# Patient Record
Sex: FEMALE | Race: WHITE | NOT HISPANIC OR LATINO | URBAN - METROPOLITAN AREA
[De-identification: names, ages, dates, MRNs, and addresses within clinical notes are randomized per-mention and may not be internally consistent; named-entity substitution may affect disease eponyms.]

---

## 2022-04-26 ENCOUNTER — APPOINTMENT (OUTPATIENT)
Dept: WOMENS IMAGING | Facility: HOSPITAL | Age: 62
End: 2022-04-26

## 2022-04-26 PROCEDURE — 77063 BREAST TOMOSYNTHESIS BI: CPT | Performed by: RADIOLOGY

## 2022-04-26 PROCEDURE — 77067 SCR MAMMO BI INCL CAD: CPT | Performed by: RADIOLOGY

## 2025-05-29 ENCOUNTER — APPOINTMENT (OUTPATIENT)
Dept: WOMENS IMAGING | Facility: HOSPITAL | Age: 65
End: 2025-05-29
Payer: COMMERCIAL

## 2025-05-29 PROCEDURE — G0279 TOMOSYNTHESIS, MAMMO: HCPCS | Performed by: RADIOLOGY

## 2025-05-29 PROCEDURE — 77065 DX MAMMO INCL CAD UNI: CPT | Performed by: RADIOLOGY

## 2025-05-29 PROCEDURE — 76642 ULTRASOUND BREAST LIMITED: CPT | Performed by: RADIOLOGY

## 2025-05-29 PROCEDURE — 77061 BREAST TOMOSYNTHESIS UNI: CPT | Performed by: RADIOLOGY

## 2025-06-17 ENCOUNTER — LAB REQUISITION (OUTPATIENT)
Dept: LAB | Facility: HOSPITAL | Age: 65
End: 2025-06-17
Payer: COMMERCIAL

## 2025-06-17 ENCOUNTER — APPOINTMENT (OUTPATIENT)
Dept: WOMENS IMAGING | Facility: HOSPITAL | Age: 65
End: 2025-06-17
Payer: COMMERCIAL

## 2025-06-17 DIAGNOSIS — N63.0 UNSPECIFIED LUMP IN UNSPECIFIED BREAST: ICD-10-CM

## 2025-06-17 PROCEDURE — 88342 IMHCHEM/IMCYTCHM 1ST ANTB: CPT | Performed by: OBSTETRICS & GYNECOLOGY

## 2025-06-17 PROCEDURE — 19000 PUNCTURE ASPIR CYST BREAST: CPT | Performed by: RADIOLOGY

## 2025-06-17 PROCEDURE — 88305 TISSUE EXAM BY PATHOLOGIST: CPT | Performed by: OBSTETRICS & GYNECOLOGY

## 2025-06-17 PROCEDURE — 88112 CYTOPATH CELL ENHANCE TECH: CPT | Performed by: OBSTETRICS & GYNECOLOGY

## 2025-06-17 PROCEDURE — 76942 ECHO GUIDE FOR BIOPSY: CPT | Performed by: RADIOLOGY

## 2025-06-17 PROCEDURE — 88360 TUMOR IMMUNOHISTOCHEM/MANUAL: CPT | Performed by: OBSTETRICS & GYNECOLOGY

## 2025-06-20 LAB
CYTO UR: NORMAL
DX PRELIMINARY: NORMAL
LAB AP CASE REPORT: NORMAL
LAB AP SPECIAL STAINS: NORMAL
PATH REPORT.FINAL DX SPEC: NORMAL
PATH REPORT.GROSS SPEC: NORMAL

## 2025-07-07 ENCOUNTER — APPOINTMENT (OUTPATIENT)
Dept: WOMENS IMAGING | Facility: HOSPITAL | Age: 65
End: 2025-07-07
Payer: COMMERCIAL

## 2025-07-07 ENCOUNTER — LAB REQUISITION (OUTPATIENT)
Dept: LAB | Facility: HOSPITAL | Age: 65
End: 2025-07-07
Payer: COMMERCIAL

## 2025-07-07 DIAGNOSIS — N63.10 UNSPECIFIED LUMP IN THE RIGHT BREAST, UNSPECIFIED QUADRANT: ICD-10-CM

## 2025-07-07 PROCEDURE — A4648 IMPLANTABLE TISSUE MARKER: HCPCS | Performed by: RADIOLOGY

## 2025-07-07 PROCEDURE — 88341 IMHCHEM/IMCYTCHM EA ADD ANTB: CPT | Performed by: OBSTETRICS & GYNECOLOGY

## 2025-07-07 PROCEDURE — 88360 TUMOR IMMUNOHISTOCHEM/MANUAL: CPT | Performed by: OBSTETRICS & GYNECOLOGY

## 2025-07-07 PROCEDURE — 88305 TISSUE EXAM BY PATHOLOGIST: CPT | Performed by: OBSTETRICS & GYNECOLOGY

## 2025-07-07 PROCEDURE — 88342 IMHCHEM/IMCYTCHM 1ST ANTB: CPT | Performed by: OBSTETRICS & GYNECOLOGY

## 2025-07-07 PROCEDURE — C1819 TISSUE LOCALIZATION-EXCISION: HCPCS | Performed by: RADIOLOGY

## 2025-07-07 PROCEDURE — 19081 BX BREAST 1ST LESION STRTCTC: CPT | Performed by: RADIOLOGY

## 2025-07-10 LAB
CYTO UR: NORMAL
DX PRELIMINARY: NORMAL
LAB AP CASE REPORT: NORMAL
LAB AP CLINICAL INFORMATION: NORMAL
LAB AP SPECIAL STAINS: NORMAL
LAB AP SYNOPTIC CHECKLIST: NORMAL
PATH REPORT.FINAL DX SPEC: NORMAL
PATH REPORT.GROSS SPEC: NORMAL

## 2025-07-14 NOTE — PROGRESS NOTES
[facility]     Referring Provider: Dr. Ritesh Joshua     Chief complaint: newly diagnosed breast cancer    Subjective    HPI: Ms. Carter is a 65 y.o. yo woman, seen at the request of Ritesh Joshua for a new diagnosis of right breast cancer.      This was initially detected as an imaging abnormality. Her work-up is detailed in the oncologic history below.   She denies any breast lumps, bumps, pain, skin changes, or nipple discharge.      She denies any prior history of abnormal mammograms or breast biopsies.     She reports she has a pertinent PMH of depression, hypothyroidism, hyperlipidemia, obesity and fatty liver.     She was joined today in clinic by her . She does consent for them to be present during her examination. They did participate in the discussion.      Oncology/Hematology History   Malignant neoplasm of upper-inner quadrant of right breast in female, estrogen receptor positive   7/7/2025 Cancer Staged    Staging form: Breast, AJCC 8th Edition  - Clinical stage from 7/7/2025: Stage IA (cT1a, cN0, cM0, G1, ER+, ND+, HER2-) - Signed by Cora Rai MD on 7/15/2025     7/15/2025 Initial Diagnosis    Malignant neoplasm of upper-inner quadrant of right breast in female, estrogen receptor positive         Review of Systems   Constitutional:  Negative for appetite change, fatigue and fever.   HENT:   Positive for hearing loss and tinnitus.    Respiratory:  Negative for cough and shortness of breath.    Gastrointestinal:  Negative for abdominal distention, diarrhea and nausea.   Endocrine: Positive for hot flashes.   Genitourinary:  Positive for frequency and nocturia.    Musculoskeletal:  Negative for arthralgias and back pain.        Arthritis   Neurological:  Negative for dizziness, headaches and speech difficulty.   Psychiatric/Behavioral:  Positive for depression. Negative for decreased concentration and sleep disturbance.        Medications:    Current Outpatient Medications:      atorvastatin (LIPITOR) 20 MG tablet, Take 1 tablet by mouth Daily., Disp: , Rfl:     busPIRone (BUSPAR) 30 MG tablet, Take 1 tablet by mouth., Disp: , Rfl:     DULoxetine (CYMBALTA) 60 MG capsule, TAKE 1 CAPSULE BY MOUTH DAILY DO NOT CRUSH OR CHEW, Disp: , Rfl:     levothyroxine (SYNTHROID, LEVOTHROID) 100 MCG tablet, Take 1 tablet by mouth Daily., Disp: , Rfl:     valsartan (DIOVAN) 80 MG tablet, , Disp: , Rfl:     Allergies:  Allergies   Allergen Reactions    Penicillins Hives and Rash       Medical history:  Past Medical History:   Diagnosis Date    Arthritis        Surgical History:  Past Surgical History:   Procedure Laterality Date    GALLBLADDER SURGERY      TUBAL ABDOMINAL LIGATION         Family History:  Family History   Problem Relation Age of Onset    Breast cancer Mother 78        Passed at 79    Melanoma Mother     Prostate cancer Father 78        Passed @ 82    Colon cancer Paternal Grandmother        Family Cancer History: relationship - (age of diagnosis/current age or age at death)  Breast: Mother  Ovarian: No family history of ovarian cancer.  Colon: Paternal grandmother   Pancreas: No family history of pancreatic cancer.  Prostate: Father  Lung Cancer: No family history of lung cancer.       Social History:   Social History     Socioeconomic History    Marital status: Unknown    Number of children: 2   Tobacco Use    Smoking status: Never    Smokeless tobacco: Never   Vaping Use    Vaping status: Never Used   Substance and Sexual Activity    Alcohol use: Never    Drug use: Never    Sexual activity: Yes     Partners: Male       She lives   She is Retired         GYNECOLOGIC HISTORY:   . P: 2. AB: 0.  Last menstrual period: 52  Age at menarche: 15  Age at first childbirth: 38  Lactation/How lon weeks  Age at menopause: 52  Total years of oral contraceptive use: N/A  Total years of hormone replacement therapy: n/A      Objective   PHYSICAL EXAMINATION:   Vitals:    07/15/25 1242    BP: 144/90   Pulse: 108   SpO2: 97%     ECOG 0 - Asymptomatic  General: NAD, well appearing  Psych: a&o x 3, normal mood and affect  Eyes: EOMI, no scleral icterus  ENMT: neck supple without masses or thyromegaly, mucus membranes moist  Resp: normal effort  CV: RRR, no edema   GI: soft, NT, ND  MSK: normal gait, normal ROM in bilateral shoulders  Lymph nodes:  no cervical, supraclavicular or axillary lymphadenopathy  Breast: symmetric, grade 2 ptosis. Small breasts bilaterally  Right: Moderate postbiopsy hematoma at biopsy site that tracks to the skin, no visible abnormalities on inspection while seated, with arms raised or hands on hips. No masses, skin changes, or nipple abnormalities.  Left:  No visible abnormalities on inspection while seated, with arms raised or hands on hips. No masses, skin changes, or nipple abnormalities.         Previous IMAGING: I have independently reviewed her imagin2025 bilateral screening mammogram (all women, [facility])  Scattered areas of fibroglandular density  Focal asymmetry measuring 4 mm in the posterior one third upper inner region of the right breast.  Left breast no suspicious masses calcifications or other abnormalities are seen.  Impression: Focal asymmetry in the right breast requires additional evaluation  BI-RADS 0    2025 right diagnostic mammogram ([facility])  Scattered areas of fibroglandular density  Additional evaluation for the focal asymmetry in the right breast upper inner seen 2025.  On present there is an isodense oval mass measuring 4 mm with circumscribed margins at 12:00 4 cm from the nipple, posterior one third region  Breast ultrasound shows parallel anechoic complicated cyst with partially defined margins 4 mm in the posterior one third right breast at 12:00 4 cm in the nipple.  Impression: Complicated cyst in the right breast a suspicious ultrasound-guided cyst aspirations recommended.  Postprocedure mammogram is also recommended.  BI-RADS  4A    6/17/2025 ultrasound-guided cyst aspiration  Patient's right breast was prepped and draped and under ultrasound guidance a 21-gauge needle was inserted into the cyst at the 12 o'clock position.  1/4 cc of yellow fluid extended into the syringe.  The walls of the cyst collapsed.  There is complete sonographic resolution.  Cytology was sent and returned as ductal cells with usual ductal hyperplasia.  Impression: Cytology shows no malignant cells, although the sonographic finding resolved a 2 view postprocedure mammogram demonstrates persistence of the initial mammographic abnormality.  Stereotactic biopsy of the mammographic finding is required for definitive diagnosis.    7/7/2025 right stereotactic biopsy ([facility])  She is right breast upper inner quadrant was isolated from the stereotactic unit and the mass was localized.  Using a 9 gauge vacuum-assisted device 12 core biopsy specimens were obtained.  Specimen radiograph confirmed mixed tissue density within the sample.  A mini cork shaped biopsy clip was placed within the biopsy bed.  2 view mammogram showed clip in the expected position and partial removal of the lesion.  Pathology returned as invasive ductal carcinoma, pathology is malignant and concordant.      PATHOLOGY:   Final Diagnosis   Date Value Ref Range Status   07/07/2025   Final    1.  Breast, right, upper inner quadrant, biopsy (mini cork clip marker): Invasive ductal adenocarcinoma   - A.  The invasive tumor measures up to 3.3 mm on the slide   - B.  The invasive tumor is Eliza grade I (tubular score 2, nuclear score 2, mitotic score 1).   - C.  Microcalcifications are not identified   - D.  No perineural or lymphovascular invasion is seen   - E.  Ductal carcinoma in situ is not present        Estrogen Receptor (ER) Status  Positive (greater than 10% of cells demonstrate nuclear positivity)   Percentage of Cells with Nuclear Positivity  %     Progesterone Receptor (PgR) Status   Positive   Percentage of Cells with Nuclear Positivity  3 %     HER2 by Immunohistochemistry  Negative (Score 1+)     Ki-67 Percentage of Positive Nuclei  8 %         Assessment & Plan     Assessment:  Emma is a 65 y.o. female with a new diagnosis of right Breast Cancer: Invasive ductal carcinoma grade I, ER+, IA+, and HER2-, T1  N0 M0  Clinical anatomic stage IA:, Clinical prognostic stage IA.    Obesity      Discussion:  I had an extensive discussion with the patient and family about the nature of this breast cancer diagnosis. We reviewed the components of breast tissue including ducts and lobules. We reviewed her pathology report in detail. We reviewed breast cancer histology, including stage, grade, ER/IA receptors, HER2 receptors and how this applies to her diagnosis. We discussed the multidisciplinary approach to breast cancer care.  Treatments can include surgery, radiation therapy, and medical therapy (chemotherapy, targeted therapy, and/or endocrine therapy).  The exact type of treatment and the order of therapy depends on the size and location/distribution of breast disease, the involvement of the regional lymph node basins, concern for or presence of metastatic disease, potential genetic mutations, and the individual breast cancer tumor markers expressed by the cancer. We also discussed other treatment options including the option of not undergoing any surgical treatment, with a palliative rather than curative intent and the risks associated with this including disease progression.    The patient's clinical stage is documented as above. This was discussed with the patient prior to initiation of treatment. All available pathology reports were discussed with the patient today. All treatment decisions were made via shared decision making with the patient. This patient was evaluated for appropriate ancillary referrals including, pre-treatment functional assessment, exercise program, nutrition program,  genetics, and lymphedema clinic. This patient received preoperative and postoperative education. The patient was offered a breast reconstruction referral appropriate to plan surgical intervention; risks and benefits were discussed today. The patient was educated on perioperative multimodal pain management strategies. Barriers to effective and efficient care are/will be evaluated by the nurse navigator.    We discussed these options and recommendations for treatment:    Surgical Options:  We discussed the surgical management of breast cancer.  Partial mastectomy with radiation and mastectomy were explained with option of reconstruction.  The patient was informed that from a survival standpoint, both procedures are oncologically equivalent. She is a good candidate for lumpectomy and she would like to proceed with breast conservation. We discussed that lumpectomy would require preoperative localization with a wire or a RAZIA. We also discussed the risk of positive margins and that she must have negative margins for lumpectomy to be an appropriate oncologic procedure. I will make every effort to obtain negative margins at her initial operation, but there is a 10-15% chance that she will require a second operation for re-excision, or possibly a total mastectomy. We will not know the margin status until after her final pathology has returned.     We discussed that for the axilla, sentinel lymph node biopsy is sometimes recommended.  By the updated ASCO recommendations may omit sentinel lymph node biopsy if imaging workup of the axillary lymph nodes are normal.  Will plan to proceed with axillary ultrasound preoperatively.  If any concern for abnormal lymph nodes on ultrasound we will proceed with surgical sampling and omit percutaneous biopsy as a negative biopsy does not satisfy the burden to prove that that lymph node is truly negative.      Medical Oncology:  We discussed that in her case, systemic treatment would  involve endocrine therapy and possibly chemotherapy. She will be referred to medical oncology postoperatively to discuss this further. She is an oncotype candidate to determine if chemotherapy following surgery would help improve her overall survival and reduce the possibility of distant metastasis OR if there would be very little benefit from these therapies.       Radiation Oncology:  Radiation is recommended as an adjuvant therapy in patients who undergo breast conservation to help reduce the risk of local regional recurrence.    Role for Genetic Testing:  I explained that most breast cancer is not hereditary, that I do not believe this plays a role in her case, and that she does not meet NCCN criteria for genetic testing. I would not recommend a bilateral mastectomy, since her risk of a second primary cancer is relatively low and endocrine therapy will further reduce her risk.          Plan:  A multidisciplinary plan has been formulated for the patient:      # Breast Surgical Oncology:  -Consents completed and signed. Discussed the risks and benefits of right partial mastectomy, RAZIA guided at length including estimated time for procedure, postoperative recovery, and postoperative instructions. Discussed the risks to include pain, bleeding, infection, nerve injury, numbness, seroma, skin complications including necrosis, breast asymmetry, need for re-excision, and scar.  Patient appears to understand and wishes to proceed.  All questions were answered.  -Preoperative axillary ultrasound.  If lymph nodes appear normal we will proceed with surgery as above.  If lymph nodes are enlarged we will plan to add sentinel lymph node biopsy to the scheduled procedure.  -Plastic surgery referral -not indicated for success at this time      # Medical Oncology:  -Oncotype candidate if tumor is large enough  - Will require 5 to 10 years of hormonal blockade  -Will refer postoperatively.    #  Radiation Oncology:  -Will refer  postoperatively.    # Nurse Navigation  - Referral made today    # Lymphedema clinic  - Not indicated if lymph node biopsy is omitted    # Genetic Testing   - Not sent    # Breast Imaging  - Next Screening mammogram due: Mammogram due 5/9/2026  - Right axillary ultrasound now      Cora Rai MD  Breast Surgical Oncology    I spent 90 minutes caring for Emma  on this date of service. This time includes time spent by me in the following activities: preparing for the visit, reviewing tests, obtaining and/or reviewing a separately obtained history, performing a medically appropriate examination and/or evaluation , counseling and educating the patient/family/caregiver, ordering medications, tests, or procedures, referring and communicating with other health care professionals , documenting information in the medical record, independently interpreting results and communicating that information with the patient/family/caregiver, and care coordination.     CC:  Emma Xiong    No follow-ups on file.

## 2025-07-15 ENCOUNTER — OFFICE VISIT (OUTPATIENT)
Dept: SURGERY | Facility: CLINIC | Age: 65
End: 2025-07-15
Payer: COMMERCIAL

## 2025-07-15 VITALS
HEART RATE: 108 BPM | WEIGHT: 203.8 LBS | DIASTOLIC BLOOD PRESSURE: 90 MMHG | BODY MASS INDEX: 36.11 KG/M2 | OXYGEN SATURATION: 97 % | SYSTOLIC BLOOD PRESSURE: 144 MMHG | HEIGHT: 63 IN

## 2025-07-15 DIAGNOSIS — Z17.0 MALIGNANT NEOPLASM OF UPPER-INNER QUADRANT OF RIGHT BREAST IN FEMALE, ESTROGEN RECEPTOR POSITIVE: Primary | ICD-10-CM

## 2025-07-15 DIAGNOSIS — C50.211 MALIGNANT NEOPLASM OF UPPER-INNER QUADRANT OF RIGHT BREAST IN FEMALE, ESTROGEN RECEPTOR POSITIVE: Primary | ICD-10-CM

## 2025-07-15 DIAGNOSIS — E66.01 MORBID (SEVERE) OBESITY DUE TO EXCESS CALORIES: ICD-10-CM

## 2025-07-15 DIAGNOSIS — C50.919 MALIGNANT NEOPLASM OF FEMALE BREAST, UNSPECIFIED ESTROGEN RECEPTOR STATUS, UNSPECIFIED LATERALITY, UNSPECIFIED SITE OF BREAST: Primary | ICD-10-CM

## 2025-07-15 PROBLEM — Z78.0 POSTMENOPAUSAL STATUS: Status: ACTIVE | Noted: 2019-07-02

## 2025-07-15 PROBLEM — I10 HYPERTENSION: Status: ACTIVE | Noted: 2024-06-21

## 2025-07-15 PROCEDURE — 99205 OFFICE O/P NEW HI 60 MIN: CPT | Performed by: STUDENT IN AN ORGANIZED HEALTH CARE EDUCATION/TRAINING PROGRAM

## 2025-07-15 PROCEDURE — 99417 PROLNG OP E/M EACH 15 MIN: CPT | Performed by: STUDENT IN AN ORGANIZED HEALTH CARE EDUCATION/TRAINING PROGRAM

## 2025-07-15 RX ORDER — DULOXETIN HYDROCHLORIDE 60 MG/1
CAPSULE, DELAYED RELEASE ORAL
COMMUNITY
Start: 2025-01-21

## 2025-07-15 RX ORDER — VALSARTAN 80 MG/1
TABLET ORAL
COMMUNITY
Start: 2025-04-20

## 2025-07-15 RX ORDER — ATORVASTATIN CALCIUM 20 MG/1
20 TABLET, FILM COATED ORAL DAILY
COMMUNITY
Start: 2025-02-21

## 2025-07-15 RX ORDER — BUSPIRONE HYDROCHLORIDE 30 MG/1
30 TABLET ORAL
COMMUNITY

## 2025-07-15 RX ORDER — LEVOTHYROXINE SODIUM 100 UG/1
100 TABLET ORAL DAILY
COMMUNITY

## 2025-07-15 NOTE — LETTER
July 15, 2025     Dimas Xiong MD  [address], [address]  [facility] [id number]    Patient: Emma Watson   YOB: 1960   Date of Visit: 7/15/2025     Dear Dimas Xiong MD:       Thank you for referring Emma Watson to me for evaluation. Below are the relevant portions of my assessment and plan of care.    If you have questions, please do not hesitate to call me. I look forward to following Emma along with you.         Sincerely,        Cora Rai MD        CC: MD Emma Alvarez II, MD Couch, Sarah, MD  07/15/25 4416  Sign when Signing Visit  [facility]     Referring Provider: Dr. Ritesh Joshua     Chief complaint: newly diagnosed breast cancer    Subjective   HPI: Ms. Carter is a 65 y.o. yo woman, seen at the request of Ritesh Joshua for a new diagnosis of right breast cancer.      This was initially detected as an imaging abnormality. Her work-up is detailed in the oncologic history below.   She denies any breast lumps, bumps, pain, skin changes, or nipple discharge.      She denies any prior history of abnormal mammograms or breast biopsies.     She reports she has a pertinent PMH of depression, hypothyroidism, hyperlipidemia, obesity and fatty liver.     She was joined today in clinic by her . She does consent for them to be present during her examination. They did participate in the discussion.      Oncology/Hematology History   Malignant neoplasm of upper-inner quadrant of right breast in female, estrogen receptor positive   7/7/2025 Cancer Staged    Staging form: Breast, AJCC 8th Edition  - Clinical stage from 7/7/2025: Stage IA (cT1a, cN0, cM0, G1, ER+, DC+, HER2-) - Signed by Cora Rai MD on 7/15/2025     7/15/2025 Initial Diagnosis    Malignant neoplasm of upper-inner quadrant of right breast in female, estrogen receptor positive         Review of Systems   Constitutional:  Negative for appetite change,  fatigue and fever.   HENT:   Positive for hearing loss and tinnitus.    Respiratory:  Negative for cough and shortness of breath.    Gastrointestinal:  Negative for abdominal distention, diarrhea and nausea.   Endocrine: Positive for hot flashes.   Genitourinary:  Positive for frequency and nocturia.    Musculoskeletal:  Negative for arthralgias and back pain.        Arthritis   Neurological:  Negative for dizziness, headaches and speech difficulty.   Psychiatric/Behavioral:  Positive for depression. Negative for decreased concentration and sleep disturbance.        Medications:    Current Outpatient Medications:   •  atorvastatin (LIPITOR) 20 MG tablet, Take 1 tablet by mouth Daily., Disp: , Rfl:   •  busPIRone (BUSPAR) 30 MG tablet, Take 1 tablet by mouth., Disp: , Rfl:   •  DULoxetine (CYMBALTA) 60 MG capsule, TAKE 1 CAPSULE BY MOUTH DAILY DO NOT CRUSH OR CHEW, Disp: , Rfl:   •  levothyroxine (SYNTHROID, LEVOTHROID) 100 MCG tablet, Take 1 tablet by mouth Daily., Disp: , Rfl:   •  valsartan (DIOVAN) 80 MG tablet, , Disp: , Rfl:     Allergies:  Allergies   Allergen Reactions   • Penicillins Hives and Rash       Medical history:  Past Medical History:   Diagnosis Date   • Arthritis        Surgical History:  Past Surgical History:   Procedure Laterality Date   • GALLBLADDER SURGERY     • TUBAL ABDOMINAL LIGATION         Family History:  Family History   Problem Relation Age of Onset   • Breast cancer Mother 78        Passed at 79   • Melanoma Mother    • Prostate cancer Father 78        Passed @ 82   • Colon cancer Paternal Grandmother        Family Cancer History: relationship - (age of diagnosis/current age or age at death)  Breast: Mother  Ovarian: No family history of ovarian cancer.  Colon: Paternal grandmother   Pancreas: No family history of pancreatic cancer.  Prostate: Father  Lung Cancer: No family history of lung cancer.       Social History:   Social History     Socioeconomic History   • Marital status:  Unknown   • Number of children: 2   Tobacco Use   • Smoking status: Never   • Smokeless tobacco: Never   Vaping Use   • Vaping status: Never Used   Substance and Sexual Activity   • Alcohol use: Never   • Drug use: Never   • Sexual activity: Yes     Partners: Male       She lives   She is Retired         GYNECOLOGIC HISTORY:   . P: 2. AB: 0.  Last menstrual period: 52  Age at menarche: 15  Age at first childbirth: 38  Lactation/How lon weeks  Age at menopause: 52  Total years of oral contraceptive use: N/A  Total years of hormone replacement therapy: n/A      Objective  PHYSICAL EXAMINATION:   Vitals:    07/15/25 1242   BP: 144/90   Pulse: 108   SpO2: 97%     ECOG 0 - Asymptomatic  General: NAD, well appearing  Psych: a&o x 3, normal mood and affect  Eyes: EOMI, no scleral icterus  ENMT: neck supple without masses or thyromegaly, mucus membranes moist  Resp: normal effort  CV: RRR, no edema   GI: soft, NT, ND  MSK: normal gait, normal ROM in bilateral shoulders  Lymph nodes:  no cervical, supraclavicular or axillary lymphadenopathy  Breast: symmetric, grade 2 ptosis. Small breasts bilaterally  Right: Moderate postbiopsy hematoma at biopsy site that tracks to the skin, no visible abnormalities on inspection while seated, with arms raised or hands on hips. No masses, skin changes, or nipple abnormalities.  Left:  No visible abnormalities on inspection while seated, with arms raised or hands on hips. No masses, skin changes, or nipple abnormalities.         Previous IMAGING: I have independently reviewed her imagin2025 bilateral screening mammogram (all women, [facility])  Scattered areas of fibroglandular density  Focal asymmetry measuring 4 mm in the posterior one third upper inner region of the right breast.  Left breast no suspicious masses calcifications or other abnormalities are seen.  Impression: Focal asymmetry in the right breast requires additional evaluation  BI-RADS 0    2025 right  diagnostic mammogram ([facility])  Scattered areas of fibroglandular density  Additional evaluation for the focal asymmetry in the right breast upper inner seen 5/8/2025.  On present there is an isodense oval mass measuring 4 mm with circumscribed margins at 12:00 4 cm from the nipple, posterior one third region  Breast ultrasound shows parallel anechoic complicated cyst with partially defined margins 4 mm in the posterior one third right breast at 12:00 4 cm in the nipple.  Impression: Complicated cyst in the right breast a suspicious ultrasound-guided cyst aspirations recommended.  Postprocedure mammogram is also recommended.  BI-RADS 4A    6/17/2025 ultrasound-guided cyst aspiration  Patient's right breast was prepped and draped and under ultrasound guidance a 21-gauge needle was inserted into the cyst at the 12 o'clock position.  1/4 cc of yellow fluid extended into the syringe.  The walls of the cyst collapsed.  There is complete sonographic resolution.  Cytology was sent and returned as ductal cells with usual ductal hyperplasia.  Impression: Cytology shows no malignant cells, although the sonographic finding resolved a 2 view postprocedure mammogram demonstrates persistence of the initial mammographic abnormality.  Stereotactic biopsy of the mammographic finding is required for definitive diagnosis.    7/7/2025 right stereotactic biopsy ([facility])  She is right breast upper inner quadrant was isolated from the stereotactic unit and the mass was localized.  Using a 9 gauge vacuum-assisted device 12 core biopsy specimens were obtained.  Specimen radiograph confirmed mixed tissue density within the sample.  A mini cork shaped biopsy clip was placed within the biopsy bed.  2 view mammogram showed clip in the expected position and partial removal of the lesion.  Pathology returned as invasive ductal carcinoma, pathology is malignant and concordant.      PATHOLOGY:   Final Diagnosis   Date Value Ref Range Status   07/07/2025    Final    1.  Breast, right, upper inner quadrant, biopsy (mini cork clip marker): Invasive ductal adenocarcinoma   - A.  The invasive tumor measures up to 3.3 mm on the slide   - B.  The invasive tumor is [place] grade I (tubular score 2, nuclear score 2, mitotic score 1).   - C.  Microcalcifications are not identified   - D.  No perineural or lymphovascular invasion is seen   - E.  Ductal carcinoma in situ is not present        Estrogen Receptor (ER) Status  Positive (greater than 10% of cells demonstrate nuclear positivity)   Percentage of Cells with Nuclear Positivity  %     Progesterone Receptor (PgR) Status  Positive   Percentage of Cells with Nuclear Positivity  3 %     HER2 by Immunohistochemistry  Negative (Score 1+)     Ki-67 Percentage of Positive Nuclei  8 %         Assessment & Plan    Assessment:  Emma is a 65 y.o. female with a new diagnosis of right Breast Cancer: Invasive ductal carcinoma grade I, ER+, NH+, and HER2-, T1  N0 M0  Clinical anatomic stage IA:, Clinical prognostic stage IA.    Obesity      Discussion:  I had an extensive discussion with the patient and family about the nature of this breast cancer diagnosis. We reviewed the components of breast tissue including ducts and lobules. We reviewed her pathology report in detail. We reviewed breast cancer histology, including stage, grade, ER/NH receptors, HER2 receptors and how this applies to her diagnosis. We discussed the multidisciplinary approach to breast cancer care.  Treatments can include surgery, radiation therapy, and medical therapy (chemotherapy, targeted therapy, and/or endocrine therapy).  The exact type of treatment and the order of therapy depends on the size and location/distribution of breast disease, the involvement of the regional lymph node basins, concern for or presence of metastatic disease, potential genetic mutations, and the individual breast cancer tumor markers expressed by the cancer. We also discussed  other treatment options including the option of not undergoing any surgical treatment, with a palliative rather than curative intent and the risks associated with this including disease progression.    The patient's clinical stage is documented as above. This was discussed with the patient prior to initiation of treatment. All available pathology reports were discussed with the patient today. All treatment decisions were made via shared decision making with the patient. This patient was evaluated for appropriate ancillary referrals including, pre-treatment functional assessment, exercise program, nutrition program, genetics, and lymphedema clinic. This patient received preoperative and postoperative education. The patient was offered a breast reconstruction referral appropriate to plan surgical intervention; risks and benefits were discussed today. The patient was educated on perioperative multimodal pain management strategies. Barriers to effective and efficient care are/will be evaluated by the nurse navigator.    We discussed these options and recommendations for treatment:    Surgical Options:  We discussed the surgical management of breast cancer.  Partial mastectomy with radiation and mastectomy were explained with option of reconstruction.  The patient was informed that from a survival standpoint, both procedures are oncologically equivalent. She is a good candidate for lumpectomy and she would like to proceed with breast conservation. We discussed that lumpectomy would require preoperative localization with a wire or a RAZIA. We also discussed the risk of positive margins and that she must have negative margins for lumpectomy to be an appropriate oncologic procedure. I will make every effort to obtain negative margins at her initial operation, but there is a 10-15% chance that she will require a second operation for re-excision, or possibly a total mastectomy. We will not know the margin status until after  her final pathology has returned.     We discussed that for the axilla, sentinel lymph node biopsy is sometimes recommended.  By the updated ASCO recommendations may omit sentinel lymph node biopsy if imaging workup of the axillary lymph nodes are normal.  Will plan to proceed with axillary ultrasound preoperatively.  If any concern for abnormal lymph nodes on ultrasound we will proceed with surgical sampling and omit percutaneous biopsy as a negative biopsy does not satisfy the burden to prove that that lymph node is truly negative.      Medical Oncology:  We discussed that in her case, systemic treatment would involve endocrine therapy and possibly chemotherapy. She will be referred to medical oncology postoperatively to discuss this further. She is an oncotype candidate to determine if chemotherapy following surgery would help improve her overall survival and reduce the possibility of distant metastasis OR if there would be very little benefit from these therapies.       Radiation Oncology:  Radiation is recommended as an adjuvant therapy in patients who undergo breast conservation to help reduce the risk of local regional recurrence.    Role for Genetic Testing:  I explained that most breast cancer is not hereditary, that I do not believe this plays a role in her case, and that she does not meet NCCN criteria for genetic testing. I would not recommend a bilateral mastectomy, since her risk of a second primary cancer is relatively low and endocrine therapy will further reduce her risk.          Plan:  A multidisciplinary plan has been formulated for the patient:      # Breast Surgical Oncology:  -Consents completed and signed. Discussed the risks and benefits of right partial mastectomy, RAZIA guided at length including estimated time for procedure, postoperative recovery, and postoperative instructions. Discussed the risks to include pain, bleeding, infection, nerve injury, numbness, seroma, skin complications  including necrosis, breast asymmetry, need for re-excision, and scar.  Patient appears to understand and wishes to proceed.  All questions were answered.  -Preoperative axillary ultrasound.  If lymph nodes appear normal we will proceed with surgery as above.  If lymph nodes are enlarged we will plan to add sentinel lymph node biopsy to the scheduled procedure.  -Plastic surgery referral -not indicated for success at this time      # Medical Oncology:  -Oncotype candidate if tumor is large enough  - Will require 5 to 10 years of hormonal blockade  -Will refer postoperatively.    #  Radiation Oncology:  -Will refer postoperatively.    # Nurse Navigation  - Referral made today    # Lymphedema clinic  - Not indicated if lymph node biopsy is omitted    # Genetic Testing   - Not sent    # Breast Imaging  - Next Screening mammogram due: Mammogram due 5/9/2026  - Right axillary ultrasound now      Cora Rai MD  Breast Surgical Oncology    I spent 90 minutes caring for Emma  on this date of service. This time includes time spent by me in the following activities: preparing for the visit, reviewing tests, obtaining and/or reviewing a separately obtained history, performing a medically appropriate examination and/or evaluation , counseling and educating the patient/family/caregiver, ordering medications, tests, or procedures, referring and communicating with other health care professionals , documenting information in the medical record, independently interpreting results and communicating that information with the patient/family/caregiver, and care coordination.     CC:  Emma Xiong    No follow-ups on file.

## 2025-07-22 ENCOUNTER — PATIENT OUTREACH (OUTPATIENT)
Dept: OTHER | Facility: HOSPITAL | Age: 65
End: 2025-07-22
Payer: COMMERCIAL

## 2025-07-22 NOTE — PROGRESS NOTES
Referral received from Dr. Rai's office. Called Ms. Watson and left a message introducing myself and navigational services. Asked her to call me back at her convenience and left my contact information.

## 2025-07-23 ENCOUNTER — PATIENT OUTREACH (OUTPATIENT)
Dept: OTHER | Facility: HOSPITAL | Age: 65
End: 2025-07-23
Payer: COMMERCIAL

## 2025-07-23 NOTE — PROGRESS NOTES
Referral received from Dr. Rai's office. Called Ms. Watson after her surgery consult and introduced myself and navigational services. She has a new diagnosis of right Breast Cancer: Invasive ductal carcinoma grade I, ER+, AK+, and HER2-, T1  N0 M0  Clinical anatomic stage IA:, Clinical prognostic stage IA.      She has a good understanding of her pathology and treatment options presented to her by Dr. Rai and was able to verbalize teach back. After the consult she is leaning toward having a lumpectomy. She is comfortable with this plan and has no questions or concerns following the consult.      We discussed resource needs and she stated she has adequate housing, no food insecurity, adequate transportation to and from appointments, feels safe at home, is not concerned with finances at this time, does not drink alcohol, does not feel lonely or isolated, and has not felt depressed, anxious or hopeless in the past two weeks.     We discussed her support system and she stated her , friends, and J[employer] family are very supportive. We discussed our supportive oncology clinic if the need arises and she was thankful for the information.     We discussed integrative therapies and other services [facility]. Will mail a navigation folder with the following information:     [employer] [facility] [employer] [facility], [employer], [employer] Exercise program, Guide for the Newly Diagnosed, [employer], Cancer Support Services Brochure, Breast Cancer Program Brochure.     She verbalized appreciation for navigational services and she has my contact information and will call with any questions that arise.

## 2025-08-04 ENCOUNTER — APPOINTMENT (OUTPATIENT)
Dept: WOMENS IMAGING | Facility: HOSPITAL | Age: 65
End: 2025-08-04
Payer: COMMERCIAL

## 2025-08-04 ENCOUNTER — PRE-ADMISSION TESTING (OUTPATIENT)
Dept: PREADMISSION TESTING | Facility: HOSPITAL | Age: 65
End: 2025-08-04
Payer: COMMERCIAL

## 2025-08-04 VITALS
WEIGHT: 201 LBS | BODY MASS INDEX: 35.61 KG/M2 | OXYGEN SATURATION: 98 % | HEIGHT: 63 IN | DIASTOLIC BLOOD PRESSURE: 71 MMHG | TEMPERATURE: 98.4 F | SYSTOLIC BLOOD PRESSURE: 149 MMHG | RESPIRATION RATE: 20 BRPM | HEART RATE: 103 BPM

## 2025-08-04 DIAGNOSIS — C50.211 MALIGNANT NEOPLASM OF UPPER-INNER QUADRANT OF RIGHT BREAST IN FEMALE, ESTROGEN RECEPTOR POSITIVE: ICD-10-CM

## 2025-08-04 DIAGNOSIS — Z17.0 MALIGNANT NEOPLASM OF UPPER-INNER QUADRANT OF RIGHT BREAST IN FEMALE, ESTROGEN RECEPTOR POSITIVE: ICD-10-CM

## 2025-08-04 LAB
ANION GAP SERPL CALCULATED.3IONS-SCNC: 12 MMOL/L (ref 5–15)
BASOPHILS # BLD AUTO: 0.07 10*3/MM3 (ref 0–0.2)
BASOPHILS NFR BLD AUTO: 0.9 % (ref 0–1.5)
BUN SERPL-MCNC: 12 MG/DL (ref 8–23)
BUN/CREAT SERPL: 16 (ref 7–25)
CALCIUM SPEC-SCNC: 8.9 MG/DL (ref 8.6–10.5)
CHLORIDE SERPL-SCNC: 102 MMOL/L (ref 98–107)
CO2 SERPL-SCNC: 23 MMOL/L (ref 22–29)
CREAT SERPL-MCNC: 0.75 MG/DL (ref 0.57–1)
DEPRECATED RDW RBC AUTO: 42.7 FL (ref 37–54)
EGFRCR SERPLBLD CKD-EPI 2021: 89.6 ML/MIN/1.73
EOSINOPHIL # BLD AUTO: 0.11 10*3/MM3 (ref 0–0.4)
EOSINOPHIL NFR BLD AUTO: 1.5 % (ref 0.3–6.2)
ERYTHROCYTE [DISTWIDTH] IN BLOOD BY AUTOMATED COUNT: 12.5 % (ref 12.3–15.4)
GLUCOSE SERPL-MCNC: 123 MG/DL (ref 65–99)
HCT VFR BLD AUTO: 38.2 % (ref 34–46.6)
HGB BLD-MCNC: 12.5 G/DL (ref 12–15.9)
IMM GRANULOCYTES # BLD AUTO: 0.02 10*3/MM3 (ref 0–0.05)
IMM GRANULOCYTES NFR BLD AUTO: 0.3 % (ref 0–0.5)
LYMPHOCYTES # BLD AUTO: 1.78 10*3/MM3 (ref 0.7–3.1)
LYMPHOCYTES NFR BLD AUTO: 23.7 % (ref 19.6–45.3)
MCH RBC QN AUTO: 30.3 PG (ref 26.6–33)
MCHC RBC AUTO-ENTMCNC: 32.7 G/DL (ref 31.5–35.7)
MCV RBC AUTO: 92.5 FL (ref 79–97)
MONOCYTES # BLD AUTO: 0.69 10*3/MM3 (ref 0.1–0.9)
MONOCYTES NFR BLD AUTO: 9.2 % (ref 5–12)
NEUTROPHILS NFR BLD AUTO: 4.84 10*3/MM3 (ref 1.7–7)
NEUTROPHILS NFR BLD AUTO: 64.4 % (ref 42.7–76)
NRBC BLD AUTO-RTO: 0 /100 WBC (ref 0–0.2)
PLATELET # BLD AUTO: 275 10*3/MM3 (ref 140–450)
PMV BLD AUTO: 9.4 FL (ref 6–12)
POTASSIUM SERPL-SCNC: 3.5 MMOL/L (ref 3.5–5.2)
QT INTERVAL: 371 MS
QTC INTERVAL: 441 MS
RBC # BLD AUTO: 4.13 10*6/MM3 (ref 3.77–5.28)
SODIUM SERPL-SCNC: 137 MMOL/L (ref 136–145)
WBC NRBC COR # BLD AUTO: 7.51 10*3/MM3 (ref 3.4–10.8)

## 2025-08-04 PROCEDURE — 19285 PERQ DEV BREAST 1ST US IMAG: CPT | Performed by: RADIOLOGY

## 2025-08-04 PROCEDURE — 76642 ULTRASOUND BREAST LIMITED: CPT | Performed by: RADIOLOGY

## 2025-08-04 PROCEDURE — 80048 BASIC METABOLIC PNL TOTAL CA: CPT

## 2025-08-04 PROCEDURE — 85025 COMPLETE CBC W/AUTO DIFF WBC: CPT

## 2025-08-04 PROCEDURE — 93005 ELECTROCARDIOGRAM TRACING: CPT

## 2025-08-04 PROCEDURE — A4648 IMPLANTABLE TISSUE MARKER: HCPCS | Performed by: RADIOLOGY

## 2025-08-04 PROCEDURE — 36415 COLL VENOUS BLD VENIPUNCTURE: CPT

## 2025-08-04 RX ORDER — ACETAMINOPHEN 325 MG/1
650 TABLET ORAL EVERY 6 HOURS PRN
COMMUNITY

## 2025-08-04 RX ORDER — BUSPIRONE HYDROCHLORIDE 5 MG/1
10 TABLET ORAL DAILY
COMMUNITY
Start: 2025-07-21

## 2025-08-04 RX ORDER — PHENOL 1.4 %
10 AEROSOL, SPRAY (ML) MUCOUS MEMBRANE NIGHTLY PRN
COMMUNITY

## 2025-08-04 RX ORDER — LEVOTHYROXINE SODIUM 88 UG/1
88 TABLET ORAL NIGHTLY
COMMUNITY

## 2025-08-06 ENCOUNTER — LAB REQUISITION (OUTPATIENT)
Dept: LAB | Facility: HOSPITAL | Age: 65
End: 2025-08-06
Payer: COMMERCIAL

## 2025-08-06 ENCOUNTER — APPOINTMENT (OUTPATIENT)
Dept: WOMENS IMAGING | Facility: HOSPITAL | Age: 65
End: 2025-08-06
Payer: COMMERCIAL

## 2025-08-06 DIAGNOSIS — N63.0 UNSPECIFIED LUMP IN UNSPECIFIED BREAST: ICD-10-CM

## 2025-08-06 PROCEDURE — 88112 CYTOPATH CELL ENHANCE TECH: CPT | Performed by: STUDENT IN AN ORGANIZED HEALTH CARE EDUCATION/TRAINING PROGRAM

## 2025-08-06 PROCEDURE — 88342 IMHCHEM/IMCYTCHM 1ST ANTB: CPT | Performed by: STUDENT IN AN ORGANIZED HEALTH CARE EDUCATION/TRAINING PROGRAM

## 2025-08-06 PROCEDURE — 76942 ECHO GUIDE FOR BIOPSY: CPT | Performed by: RADIOLOGY

## 2025-08-06 PROCEDURE — 19000 PUNCTURE ASPIR CYST BREAST: CPT | Performed by: RADIOLOGY

## 2025-08-06 PROCEDURE — 88305 TISSUE EXAM BY PATHOLOGIST: CPT | Performed by: STUDENT IN AN ORGANIZED HEALTH CARE EDUCATION/TRAINING PROGRAM

## 2025-08-11 LAB
DX PRELIMINARY: NORMAL
LAB AP CASE REPORT: NORMAL
LAB AP CLINICAL INFORMATION: NORMAL
LAB AP DIAGNOSIS COMMENT: NORMAL
PATH REPORT.FINAL DX SPEC: NORMAL
PATH REPORT.GROSS SPEC: NORMAL

## 2025-08-14 ENCOUNTER — TELEPHONE (OUTPATIENT)
Dept: SURGERY | Facility: CLINIC | Age: 65
End: 2025-08-14
Payer: COMMERCIAL

## 2025-08-14 DIAGNOSIS — Z17.0 MALIGNANT NEOPLASM OF UPPER-INNER QUADRANT OF RIGHT BREAST IN FEMALE, ESTROGEN RECEPTOR POSITIVE: Primary | ICD-10-CM

## 2025-08-14 DIAGNOSIS — C50.211 MALIGNANT NEOPLASM OF UPPER-INNER QUADRANT OF RIGHT BREAST IN FEMALE, ESTROGEN RECEPTOR POSITIVE: Primary | ICD-10-CM

## 2025-08-15 ENCOUNTER — HOSPITAL ENCOUNTER (OUTPATIENT)
Dept: NUCLEAR MEDICINE | Facility: HOSPITAL | Age: 65
Discharge: HOME | End: 2025-08-15
Payer: COMMERCIAL

## 2025-08-15 ENCOUNTER — TRANSCRIBE ORDERS (OUTPATIENT)
Dept: LAB | Facility: HOSPITAL | Age: 65
End: 2025-08-15
Payer: COMMERCIAL

## 2025-08-15 ENCOUNTER — ANESTHESIA EVENT (OUTPATIENT)
Dept: PERIOP | Facility: HOSPITAL | Age: 65
End: 2025-08-15
Payer: COMMERCIAL

## 2025-08-15 ENCOUNTER — HOSPITAL ENCOUNTER (OUTPATIENT)
Facility: HOSPITAL | Age: 65
Setting detail: HOSPITAL OUTPATIENT SURGERY
Discharge: HOME | End: 2025-08-15
Attending: STUDENT IN AN ORGANIZED HEALTH CARE EDUCATION/TRAINING PROGRAM | Admitting: STUDENT IN AN ORGANIZED HEALTH CARE EDUCATION/TRAINING PROGRAM
Payer: COMMERCIAL

## 2025-08-15 ENCOUNTER — ANESTHESIA (OUTPATIENT)
Dept: PERIOP | Facility: HOSPITAL | Age: 65
End: 2025-08-15
Payer: COMMERCIAL

## 2025-08-15 ENCOUNTER — ANCILLARY PROCEDURE (OUTPATIENT)
Dept: LAB | Facility: HOSPITAL | Age: 65
End: 2025-08-15
Payer: COMMERCIAL

## 2025-08-15 ENCOUNTER — APPOINTMENT (OUTPATIENT)
Dept: GENERAL RADIOLOGY | Facility: HOSPITAL | Age: 65
End: 2025-08-15
Payer: COMMERCIAL

## 2025-08-15 VITALS
DIASTOLIC BLOOD PRESSURE: 89 MMHG | TEMPERATURE: 97.8 F | RESPIRATION RATE: 16 BRPM | HEART RATE: 96 BPM | SYSTOLIC BLOOD PRESSURE: 108 MMHG | BODY MASS INDEX: 35.61 KG/M2 | OXYGEN SATURATION: 95 % | HEIGHT: 63 IN

## 2025-08-15 DIAGNOSIS — Z17.0 MALIGNANT NEOPLASM OF UPPER-INNER QUADRANT OF RIGHT BREAST IN FEMALE, ESTROGEN RECEPTOR POSITIVE: ICD-10-CM

## 2025-08-15 DIAGNOSIS — C50.911 MALIGNANT NEOPLASM OF RIGHT FEMALE BREAST, UNSPECIFIED ESTROGEN RECEPTOR STATUS, UNSPECIFIED SITE OF BREAST: ICD-10-CM

## 2025-08-15 DIAGNOSIS — C50.211 MALIGNANT NEOPLASM OF UPPER-INNER QUADRANT OF RIGHT BREAST IN FEMALE, ESTROGEN RECEPTOR POSITIVE: ICD-10-CM

## 2025-08-15 DIAGNOSIS — C50.911 MALIGNANT NEOPLASM OF RIGHT FEMALE BREAST, UNSPECIFIED ESTROGEN RECEPTOR STATUS, UNSPECIFIED SITE OF BREAST: Primary | ICD-10-CM

## 2025-08-15 PROCEDURE — 19301 PARTIAL MASTECTOMY: CPT | Performed by: SPECIALIST/TECHNOLOGIST, OTHER

## 2025-08-15 PROCEDURE — 38792 RA TRACER ID OF SENTINL NODE: CPT

## 2025-08-15 PROCEDURE — 88342 IMHCHEM/IMCYTCHM 1ST ANTB: CPT | Performed by: STUDENT IN AN ORGANIZED HEALTH CARE EDUCATION/TRAINING PROGRAM

## 2025-08-15 PROCEDURE — 34310000005 TECHETIUM TC99M TILMANOCEPT: Performed by: STUDENT IN AN ORGANIZED HEALTH CARE EDUCATION/TRAINING PROGRAM

## 2025-08-15 PROCEDURE — 78195 LYMPH SYSTEM IMAGING: CPT | Performed by: STUDENT IN AN ORGANIZED HEALTH CARE EDUCATION/TRAINING PROGRAM

## 2025-08-15 PROCEDURE — 25810000003 LACTATED RINGERS PER 1000 ML: Performed by: STUDENT IN AN ORGANIZED HEALTH CARE EDUCATION/TRAINING PROGRAM

## 2025-08-15 PROCEDURE — 25010000002 BUPIVACAINE (PF) 0.5 % SOLUTION 10 ML VIAL: Performed by: STUDENT IN AN ORGANIZED HEALTH CARE EDUCATION/TRAINING PROGRAM

## 2025-08-15 PROCEDURE — A9520 TC99 TILMANOCEPT DIAG 0.5MCI: HCPCS | Performed by: STUDENT IN AN ORGANIZED HEALTH CARE EDUCATION/TRAINING PROGRAM

## 2025-08-15 PROCEDURE — 88307 TISSUE EXAM BY PATHOLOGIST: CPT | Performed by: STUDENT IN AN ORGANIZED HEALTH CARE EDUCATION/TRAINING PROGRAM

## 2025-08-15 PROCEDURE — 25010000002 LIDOCAINE 1% - EPINEPHRINE 1:100000 1 %-1:100000 SOLUTION 30 ML VIAL: Performed by: STUDENT IN AN ORGANIZED HEALTH CARE EDUCATION/TRAINING PROGRAM

## 2025-08-15 PROCEDURE — 25010000002 PROPOFOL 200 MG/20ML EMULSION

## 2025-08-15 PROCEDURE — 76098 X-RAY EXAM SURGICAL SPECIMEN: CPT

## 2025-08-15 PROCEDURE — 25010000002 KETOROLAC TROMETHAMINE PER 15 MG

## 2025-08-15 PROCEDURE — 19301 PARTIAL MASTECTOMY: CPT | Performed by: STUDENT IN AN ORGANIZED HEALTH CARE EDUCATION/TRAINING PROGRAM

## 2025-08-15 PROCEDURE — S0260 H&P FOR SURGERY: HCPCS | Performed by: STUDENT IN AN ORGANIZED HEALTH CARE EDUCATION/TRAINING PROGRAM

## 2025-08-15 PROCEDURE — 25010000002 ISOSULFAN BLUE 1 % SOLUTION: Performed by: STUDENT IN AN ORGANIZED HEALTH CARE EDUCATION/TRAINING PROGRAM

## 2025-08-15 PROCEDURE — 25010000002 LIDOCAINE 2% SOLUTION

## 2025-08-15 PROCEDURE — 25010000002 ONDANSETRON PER 1 MG

## 2025-08-15 PROCEDURE — 25010000002 FAMOTIDINE 10 MG/ML SOLUTION: Performed by: STUDENT IN AN ORGANIZED HEALTH CARE EDUCATION/TRAINING PROGRAM

## 2025-08-15 PROCEDURE — 38900 IO MAP OF SENT LYMPH NODE: CPT | Performed by: STUDENT IN AN ORGANIZED HEALTH CARE EDUCATION/TRAINING PROGRAM

## 2025-08-15 PROCEDURE — 25010000002 PROPOFOL 1000 MG/100ML EMULSION

## 2025-08-15 PROCEDURE — 88341 IMHCHEM/IMCYTCHM EA ADD ANTB: CPT | Performed by: STUDENT IN AN ORGANIZED HEALTH CARE EDUCATION/TRAINING PROGRAM

## 2025-08-15 PROCEDURE — 25010000002 CLINDAMYCIN 900 MG/50ML SOLUTION: Performed by: STUDENT IN AN ORGANIZED HEALTH CARE EDUCATION/TRAINING PROGRAM

## 2025-08-15 PROCEDURE — 38525 BIOPSY/REMOVAL LYMPH NODES: CPT | Performed by: STUDENT IN AN ORGANIZED HEALTH CARE EDUCATION/TRAINING PROGRAM

## 2025-08-15 PROCEDURE — 25010000002 GLYCOPYRROLATE 1 MG/5ML SOLUTION

## 2025-08-15 PROCEDURE — 88360 TUMOR IMMUNOHISTOCHEM/MANUAL: CPT | Performed by: STUDENT IN AN ORGANIZED HEALTH CARE EDUCATION/TRAINING PROGRAM

## 2025-08-15 DEVICE — LIGACLIP MCA MULTIPLE CLIP APPLIERS, 20 MEDIUM CLIPS
Type: IMPLANTABLE DEVICE | Site: BREAST | Status: FUNCTIONAL
Brand: LIGACLIP

## 2025-08-15 RX ORDER — PROMETHAZINE HYDROCHLORIDE 25 MG/1
25 SUPPOSITORY RECTAL ONCE AS NEEDED
Status: DISCONTINUED | OUTPATIENT
Start: 2025-08-15 | End: 2025-08-15 | Stop reason: HOSPADM

## 2025-08-15 RX ORDER — FENTANYL CITRATE 50 UG/ML
50 INJECTION, SOLUTION INTRAMUSCULAR; INTRAVENOUS ONCE AS NEEDED
Status: DISCONTINUED | OUTPATIENT
Start: 2025-08-15 | End: 2025-08-15 | Stop reason: HOSPADM

## 2025-08-15 RX ORDER — FAMOTIDINE 10 MG/ML
20 INJECTION, SOLUTION INTRAVENOUS ONCE
Status: COMPLETED | OUTPATIENT
Start: 2025-08-15 | End: 2025-08-15

## 2025-08-15 RX ORDER — DIPHENHYDRAMINE HYDROCHLORIDE 50 MG/ML
12.5 INJECTION, SOLUTION INTRAMUSCULAR; INTRAVENOUS
Status: DISCONTINUED | OUTPATIENT
Start: 2025-08-15 | End: 2025-08-15 | Stop reason: HOSPADM

## 2025-08-15 RX ORDER — MIDAZOLAM HYDROCHLORIDE 1 MG/ML
1 INJECTION, SOLUTION INTRAMUSCULAR; INTRAVENOUS
Status: DISCONTINUED | OUTPATIENT
Start: 2025-08-15 | End: 2025-08-15 | Stop reason: HOSPADM

## 2025-08-15 RX ORDER — HYDRALAZINE HYDROCHLORIDE 20 MG/ML
5 INJECTION INTRAMUSCULAR; INTRAVENOUS
Status: DISCONTINUED | OUTPATIENT
Start: 2025-08-15 | End: 2025-08-15 | Stop reason: HOSPADM

## 2025-08-15 RX ORDER — KETOROLAC TROMETHAMINE 30 MG/ML
INJECTION, SOLUTION INTRAMUSCULAR; INTRAVENOUS AS NEEDED
Status: DISCONTINUED | OUTPATIENT
Start: 2025-08-15 | End: 2025-08-15 | Stop reason: SURG

## 2025-08-15 RX ORDER — ONDANSETRON 2 MG/ML
INJECTION INTRAMUSCULAR; INTRAVENOUS AS NEEDED
Status: DISCONTINUED | OUTPATIENT
Start: 2025-08-15 | End: 2025-08-15 | Stop reason: SURG

## 2025-08-15 RX ORDER — TRAMADOL HYDROCHLORIDE 50 MG/1
50 TABLET ORAL EVERY 6 HOURS PRN
Qty: 12 TABLET | Refills: 0 | Status: SHIPPED | OUTPATIENT
Start: 2025-08-15

## 2025-08-15 RX ORDER — ISOSULFAN BLUE 50 MG/5ML
INJECTION, SOLUTION SUBCUTANEOUS AS NEEDED
Status: DISCONTINUED | OUTPATIENT
Start: 2025-08-15 | End: 2025-08-15 | Stop reason: HOSPADM

## 2025-08-15 RX ORDER — FLUMAZENIL 0.1 MG/ML
0.2 INJECTION INTRAVENOUS AS NEEDED
Status: DISCONTINUED | OUTPATIENT
Start: 2025-08-15 | End: 2025-08-15 | Stop reason: HOSPADM

## 2025-08-15 RX ORDER — FENTANYL CITRATE 50 UG/ML
25 INJECTION, SOLUTION INTRAMUSCULAR; INTRAVENOUS
Status: DISCONTINUED | OUTPATIENT
Start: 2025-08-15 | End: 2025-08-15 | Stop reason: HOSPADM

## 2025-08-15 RX ORDER — PROMETHAZINE HYDROCHLORIDE 25 MG/1
25 TABLET ORAL ONCE AS NEEDED
Status: DISCONTINUED | OUTPATIENT
Start: 2025-08-15 | End: 2025-08-15 | Stop reason: HOSPADM

## 2025-08-15 RX ORDER — CLINDAMYCIN PHOSPHATE 900 MG/50ML
900 INJECTION, SOLUTION INTRAVENOUS ONCE
Status: COMPLETED | OUTPATIENT
Start: 2025-08-15 | End: 2025-08-15

## 2025-08-15 RX ORDER — SODIUM CHLORIDE, SODIUM LACTATE, POTASSIUM CHLORIDE, CALCIUM CHLORIDE 600; 310; 30; 20 MG/100ML; MG/100ML; MG/100ML; MG/100ML
9 INJECTION, SOLUTION INTRAVENOUS CONTINUOUS
Status: DISCONTINUED | OUTPATIENT
Start: 2025-08-15 | End: 2025-08-15 | Stop reason: HOSPADM

## 2025-08-15 RX ORDER — POLYETHYLENE GLYCOL 3350 17 G/17G
17 POWDER, FOR SOLUTION ORAL DAILY
Qty: 238 G | Refills: 0 | Status: SHIPPED | OUTPATIENT
Start: 2025-08-15

## 2025-08-15 RX ORDER — ATROPINE SULFATE 0.4 MG/ML
0.4 INJECTION, SOLUTION INTRAMUSCULAR; INTRAVENOUS; SUBCUTANEOUS ONCE AS NEEDED
Status: DISCONTINUED | OUTPATIENT
Start: 2025-08-15 | End: 2025-08-15 | Stop reason: HOSPADM

## 2025-08-15 RX ORDER — ONDANSETRON 2 MG/ML
4 INJECTION INTRAMUSCULAR; INTRAVENOUS ONCE AS NEEDED
Status: DISCONTINUED | OUTPATIENT
Start: 2025-08-15 | End: 2025-08-15 | Stop reason: HOSPADM

## 2025-08-15 RX ORDER — LIDOCAINE 40 MG/G
1 CREAM TOPICAL ONCE
Status: COMPLETED | OUTPATIENT
Start: 2025-08-15 | End: 2025-08-15

## 2025-08-15 RX ORDER — GLYCOPYRROLATE 0.2 MG/ML
INJECTION INTRAMUSCULAR; INTRAVENOUS AS NEEDED
Status: DISCONTINUED | OUTPATIENT
Start: 2025-08-15 | End: 2025-08-15 | Stop reason: SURG

## 2025-08-15 RX ORDER — EPHEDRINE SULFATE 50 MG/ML
5 INJECTION, SOLUTION INTRAVENOUS ONCE AS NEEDED
Status: DISCONTINUED | OUTPATIENT
Start: 2025-08-15 | End: 2025-08-15 | Stop reason: HOSPADM

## 2025-08-15 RX ORDER — HYDROMORPHONE HYDROCHLORIDE 1 MG/ML
0.25 INJECTION, SOLUTION INTRAMUSCULAR; INTRAVENOUS; SUBCUTANEOUS
Status: DISCONTINUED | OUTPATIENT
Start: 2025-08-15 | End: 2025-08-15 | Stop reason: HOSPADM

## 2025-08-15 RX ORDER — SODIUM CHLORIDE 0.9 % (FLUSH) 0.9 %
3 SYRINGE (ML) INJECTION EVERY 12 HOURS SCHEDULED
Status: DISCONTINUED | OUTPATIENT
Start: 2025-08-15 | End: 2025-08-15 | Stop reason: HOSPADM

## 2025-08-15 RX ORDER — HYDROCODONE BITARTRATE AND ACETAMINOPHEN 5; 325 MG/1; MG/1
1 TABLET ORAL ONCE AS NEEDED
Status: DISCONTINUED | OUTPATIENT
Start: 2025-08-15 | End: 2025-08-15 | Stop reason: HOSPADM

## 2025-08-15 RX ORDER — SODIUM CHLORIDE 0.9 % (FLUSH) 0.9 %
3-10 SYRINGE (ML) INJECTION AS NEEDED
Status: DISCONTINUED | OUTPATIENT
Start: 2025-08-15 | End: 2025-08-15 | Stop reason: HOSPADM

## 2025-08-15 RX ORDER — DIAZEPAM 5 MG/1
5 TABLET ORAL ONCE
Status: COMPLETED | OUTPATIENT
Start: 2025-08-15 | End: 2025-08-15

## 2025-08-15 RX ORDER — PROPOFOL 10 MG/ML
INJECTION, EMULSION INTRAVENOUS CONTINUOUS PRN
Status: DISCONTINUED | OUTPATIENT
Start: 2025-08-15 | End: 2025-08-15 | Stop reason: SURG

## 2025-08-15 RX ORDER — DROPERIDOL 2.5 MG/ML
0.62 INJECTION, SOLUTION INTRAMUSCULAR; INTRAVENOUS
Status: DISCONTINUED | OUTPATIENT
Start: 2025-08-15 | End: 2025-08-15 | Stop reason: HOSPADM

## 2025-08-15 RX ORDER — LIDOCAINE HYDROCHLORIDE 20 MG/ML
INJECTION, SOLUTION INFILTRATION; PERINEURAL AS NEEDED
Status: DISCONTINUED | OUTPATIENT
Start: 2025-08-15 | End: 2025-08-15 | Stop reason: SURG

## 2025-08-15 RX ORDER — TRAMADOL HYDROCHLORIDE 50 MG/1
50 TABLET ORAL ONCE
Status: COMPLETED | OUTPATIENT
Start: 2025-08-15 | End: 2025-08-15

## 2025-08-15 RX ORDER — HYDROCODONE BITARTRATE AND ACETAMINOPHEN 7.5; 325 MG/1; MG/1
1 TABLET ORAL EVERY 4 HOURS PRN
Status: DISCONTINUED | OUTPATIENT
Start: 2025-08-15 | End: 2025-08-15 | Stop reason: HOSPADM

## 2025-08-15 RX ORDER — LABETALOL HYDROCHLORIDE 5 MG/ML
5 INJECTION, SOLUTION INTRAVENOUS
Status: DISCONTINUED | OUTPATIENT
Start: 2025-08-15 | End: 2025-08-15 | Stop reason: HOSPADM

## 2025-08-15 RX ORDER — LIDOCAINE HYDROCHLORIDE 10 MG/ML
0.5 INJECTION, SOLUTION INFILTRATION; PERINEURAL ONCE AS NEEDED
Status: DISCONTINUED | OUTPATIENT
Start: 2025-08-15 | End: 2025-08-15 | Stop reason: HOSPADM

## 2025-08-15 RX ORDER — PROPOFOL 10 MG/ML
INJECTION, EMULSION INTRAVENOUS AS NEEDED
Status: DISCONTINUED | OUTPATIENT
Start: 2025-08-15 | End: 2025-08-15 | Stop reason: SURG

## 2025-08-15 RX ORDER — NALOXONE HCL 0.4 MG/ML
0.2 VIAL (ML) INJECTION AS NEEDED
Status: DISCONTINUED | OUTPATIENT
Start: 2025-08-15 | End: 2025-08-15 | Stop reason: HOSPADM

## 2025-08-15 RX ORDER — IPRATROPIUM BROMIDE AND ALBUTEROL SULFATE 2.5; .5 MG/3ML; MG/3ML
3 SOLUTION RESPIRATORY (INHALATION) ONCE AS NEEDED
Status: DISCONTINUED | OUTPATIENT
Start: 2025-08-15 | End: 2025-08-15 | Stop reason: HOSPADM

## 2025-08-15 RX ADMIN — DIAZEPAM 5 MG: 5 TABLET ORAL at 06:20

## 2025-08-15 RX ADMIN — FAMOTIDINE 20 MG: 10 INJECTION INTRAVENOUS at 06:26

## 2025-08-15 RX ADMIN — GLYCOPYRROLATE 0.1 MG: 0.2 INJECTION INTRAMUSCULAR; INTRAVENOUS at 08:11

## 2025-08-15 RX ADMIN — TILMANOCEPT 1 DOSE: KIT at 07:16

## 2025-08-15 RX ADMIN — SODIUM CHLORIDE, POTASSIUM CHLORIDE, SODIUM LACTATE AND CALCIUM CHLORIDE 9 ML/HR: 600; 310; 30; 20 INJECTION, SOLUTION INTRAVENOUS at 06:27

## 2025-08-15 RX ADMIN — PROPOFOL INJECTABLE EMULSION 100 MG: 10 INJECTION, EMULSION INTRAVENOUS at 08:20

## 2025-08-15 RX ADMIN — KETOROLAC TROMETHAMINE 30 MG: 30 INJECTION INTRAMUSCULAR; INTRAVENOUS at 09:47

## 2025-08-15 RX ADMIN — LIDOCAINE 4% 1 APPLICATION: 4 CREAM TOPICAL at 06:02

## 2025-08-15 RX ADMIN — PROPOFOL INJECTABLE EMULSION 60 MG: 10 INJECTION, EMULSION INTRAVENOUS at 08:02

## 2025-08-15 RX ADMIN — CLINDAMYCIN PHOSPHATE 900 MG: 900 INJECTION, SOLUTION INTRAVENOUS at 07:54

## 2025-08-15 RX ADMIN — TRAMADOL HYDROCHLORIDE 50 MG: 50 TABLET, COATED ORAL at 10:19

## 2025-08-15 RX ADMIN — LIDOCAINE HYDROCHLORIDE 60 MG: 20 INJECTION, SOLUTION INFILTRATION; PERINEURAL at 08:02

## 2025-08-15 RX ADMIN — PROPOFOL 125 MCG/KG/MIN: 10 INJECTION, EMULSION INTRAVENOUS at 08:02

## 2025-08-15 RX ADMIN — ONDANSETRON 4 MG: 2 INJECTION INTRAMUSCULAR; INTRAVENOUS at 08:35

## 2025-08-22 ENCOUNTER — RESULTS FOLLOW-UP (OUTPATIENT)
Dept: PERIOP | Facility: HOSPITAL | Age: 65
End: 2025-08-22
Payer: COMMERCIAL

## 2025-08-22 DIAGNOSIS — Z17.0 MALIGNANT NEOPLASM OF UPPER-INNER QUADRANT OF RIGHT BREAST IN FEMALE, ESTROGEN RECEPTOR POSITIVE: Primary | ICD-10-CM

## 2025-08-22 DIAGNOSIS — C50.211 MALIGNANT NEOPLASM OF UPPER-INNER QUADRANT OF RIGHT BREAST IN FEMALE, ESTROGEN RECEPTOR POSITIVE: Primary | ICD-10-CM

## 2025-08-22 LAB
CYTO UR: NORMAL
LAB AP CASE REPORT: NORMAL
LAB AP DIAGNOSIS COMMENT: NORMAL
LAB AP SPECIAL STAINS: NORMAL
LAB AP SYNOPTIC CHECKLIST: NORMAL
PATH REPORT.FINAL DX SPEC: NORMAL
PATH REPORT.GROSS SPEC: NORMAL

## 2025-08-28 ENCOUNTER — OFFICE VISIT (OUTPATIENT)
Dept: SURGERY | Facility: CLINIC | Age: 65
End: 2025-08-28
Payer: COMMERCIAL

## 2025-08-28 VITALS
BODY MASS INDEX: 36.18 KG/M2 | DIASTOLIC BLOOD PRESSURE: 88 MMHG | HEART RATE: 108 BPM | OXYGEN SATURATION: 97 % | SYSTOLIC BLOOD PRESSURE: 164 MMHG | WEIGHT: 204.2 LBS | HEIGHT: 63 IN

## 2025-08-28 DIAGNOSIS — C50.211 MALIGNANT NEOPLASM OF UPPER-INNER QUADRANT OF RIGHT BREAST IN FEMALE, ESTROGEN RECEPTOR POSITIVE: Primary | ICD-10-CM

## 2025-08-28 DIAGNOSIS — Z17.0 MALIGNANT NEOPLASM OF UPPER-INNER QUADRANT OF RIGHT BREAST IN FEMALE, ESTROGEN RECEPTOR POSITIVE: Primary | ICD-10-CM

## 2025-08-28 PROCEDURE — 99024 POSTOP FOLLOW-UP VISIT: CPT | Performed by: STUDENT IN AN ORGANIZED HEALTH CARE EDUCATION/TRAINING PROGRAM

## (undated) DEVICE — STPLR SKIN VISISTAT WD 35CT

## (undated) DEVICE — CONTAINER,SPECIMEN,OR STERILE,4OZ: Brand: MEDLINE

## (undated) DEVICE — TOWEL,OR,DSP,ST,BLUE,STD,4/PK,20PK/CS: Brand: MEDLINE

## (undated) DEVICE — GAUZE,SPONGE,4"X4",12PLY,STRL,LF,10/TRAY: Brand: MEDLINE

## (undated) DEVICE — SYR LL TP 10ML STRL

## (undated) DEVICE — GLV SURG SENSICARE PI LF PF 7.5 GRN STRL

## (undated) DEVICE — DRP SLUSH WARMR MACH CIR 44X44IN

## (undated) DEVICE — CVR TRANSD CIV FLX TPR 11.9 TO 3.8X61CM

## (undated) DEVICE — SUT SILK 2/0 SH 30IN K833H

## (undated) DEVICE — SHEATH GUIDE SCOUT SURG TPR 8.3TO3.2CM 264CM STRL

## (undated) DEVICE — ANTIBACTERIAL UNDYED BRAIDED (POLYGLACTIN 910), SYNTHETIC ABSORBABLE SUTURE: Brand: COATED VICRYL

## (undated) DEVICE — PATIENT RETURN ELECTRODE, SINGLE-USE, CONTACT QUALITY MONITORING, ADULT, WITH 9FT CORD, FOR PATIENTS WEIGING OVER 33LBS. (15KG): Brand: MEGADYNE

## (undated) DEVICE — GLV SURG BIOGEL LTX PF 7

## (undated) DEVICE — EXOFIN PRECISION PEN HIGH VISCOSITY TOPICAL SKIN ADHESIVE: Brand: EXOFIN PRECISION PEN, 1G

## (undated) DEVICE — PAD,ABDOMINAL,8"X10",ST,LF: Brand: MEDLINE

## (undated) DEVICE — KT INK TISS MARGINMARKER STD 6COLOR

## (undated) DEVICE — SYR LUERLOK 5CC

## (undated) DEVICE — NDL HYPO PRECISIONGLIDE REG 25G 1 1/2

## (undated) DEVICE — TRAP FLD MINIVAC MEGADYNE 100ML

## (undated) DEVICE — APPL CHLORAPREP HI/LITE 26ML ORNG

## (undated) DEVICE — PK UNIV COMPL 40

## (undated) DEVICE — STRIP CLS WND SUTURESTRIP/PLS 0.5X5IN TP1105

## (undated) DEVICE — BANDAGE,GAUZE,BULKEE II,4.5"X4.1YD,STRL: Brand: MEDLINE

## (undated) DEVICE — CONN TBG Y 5 IN 1 LF STRL

## (undated) DEVICE — SUT MNCRYL PLS ANTIB UD 4/0 PS2 18IN

## (undated) DEVICE — ELECTRD BLD EZ CLN MOD XLNG 2.75IN

## (undated) DEVICE — LEGGINGS, PAIR, 31X48, STERILE: Brand: MEDLINE

## (undated) DEVICE — COVER,MAYO STAND,STERILE: Brand: MEDLINE